# Patient Record
Sex: MALE | Race: WHITE | Employment: FULL TIME | URBAN - METROPOLITAN AREA
[De-identification: names, ages, dates, MRNs, and addresses within clinical notes are randomized per-mention and may not be internally consistent; named-entity substitution may affect disease eponyms.]

---

## 2017-06-08 ENCOUNTER — HOSPITAL ENCOUNTER (OUTPATIENT)
Dept: CT IMAGING | Age: 37
Discharge: HOME | End: 2017-06-08
Attending: EMERGENCY MEDICINE
Payer: COMMERCIAL

## 2017-06-08 VITALS — WEIGHT: 180 LBS | BODY MASS INDEX: 25.77 KG/M2 | HEIGHT: 70 IN

## 2017-06-08 DIAGNOSIS — R10.31 RLQ ABDOMINAL PAIN: ICD-10-CM

## 2017-06-08 PROCEDURE — 74011636320 HC RX REV CODE- 636/320

## 2017-06-08 PROCEDURE — 74177 CT ABD & PELVIS W/CONTRAST: CPT

## 2017-06-08 PROCEDURE — 74011000258 HC RX REV CODE- 258

## 2017-06-08 RX ORDER — SODIUM CHLORIDE 0.9 % (FLUSH) 0.9 %
10 SYRINGE (ML) INJECTION
Status: COMPLETED | OUTPATIENT
Start: 2017-06-08 | End: 2017-06-08

## 2017-06-08 RX ADMIN — Medication 10 ML: at 20:37

## 2017-06-08 RX ADMIN — SODIUM CHLORIDE 100 ML: 900 INJECTION, SOLUTION INTRAVENOUS at 20:37

## 2017-06-08 RX ADMIN — IOPAMIDOL 100 ML: 755 INJECTION, SOLUTION INTRAVENOUS at 20:37

## 2017-06-08 RX ADMIN — DIATRIZOATE MEGLUMINE AND DIATRIZOATE SODIUM 15 ML: 660; 100 LIQUID ORAL; RECTAL at 20:37

## 2018-11-16 ENCOUNTER — HOSPITAL ENCOUNTER (EMERGENCY)
Age: 38
Discharge: HOME | End: 2018-11-16
Attending: EMERGENCY MEDICINE
Payer: SELF-PAY

## 2018-11-16 VITALS
DIASTOLIC BLOOD PRESSURE: 90 MMHG | BODY MASS INDEX: 25.77 KG/M2 | WEIGHT: 180 LBS | HEART RATE: 90 BPM | TEMPERATURE: 98.1 F | OXYGEN SATURATION: 96 % | SYSTOLIC BLOOD PRESSURE: 135 MMHG | HEIGHT: 70 IN | RESPIRATION RATE: 20 BRPM

## 2018-11-16 DIAGNOSIS — M53.3 SACROILIAC JOINT PAIN: Primary | ICD-10-CM

## 2018-11-16 DIAGNOSIS — M54.31 SCIATICA OF RIGHT SIDE: ICD-10-CM

## 2018-11-16 PROCEDURE — 99283 EMERGENCY DEPT VISIT LOW MDM: CPT | Performed by: EMERGENCY MEDICINE

## 2018-11-16 PROCEDURE — 74011250636 HC RX REV CODE- 250/636: Performed by: EMERGENCY MEDICINE

## 2018-11-16 PROCEDURE — 96372 THER/PROPH/DIAG INJ SC/IM: CPT | Performed by: EMERGENCY MEDICINE

## 2018-11-16 RX ORDER — HYDROCODONE BITARTRATE AND ACETAMINOPHEN 5; 325 MG/1; MG/1
1 TABLET ORAL
Qty: 6 TAB | Refills: 0 | Status: SHIPPED | OUTPATIENT
Start: 2018-11-16

## 2018-11-16 RX ORDER — PREDNISONE 20 MG/1
40 TABLET ORAL DAILY
Qty: 10 TAB | Refills: 0 | Status: SHIPPED | OUTPATIENT
Start: 2018-11-16 | End: 2018-11-21

## 2018-11-16 RX ORDER — DEXAMETHASONE SODIUM PHOSPHATE 100 MG/10ML
10 INJECTION INTRAMUSCULAR; INTRAVENOUS ONCE
Status: COMPLETED | OUTPATIENT
Start: 2018-11-16 | End: 2018-11-16

## 2018-11-16 RX ORDER — MORPHINE SULFATE 10 MG/ML
6 INJECTION, SOLUTION INTRAMUSCULAR; INTRAVENOUS
Status: COMPLETED | OUTPATIENT
Start: 2018-11-16 | End: 2018-11-16

## 2018-11-16 RX ADMIN — MORPHINE SULFATE 6 MG: 10 INJECTION INTRAVENOUS at 14:18

## 2018-11-16 RX ADMIN — DEXAMETHASONE SODIUM PHOSPHATE 10 MG: 10 INJECTION INTRAMUSCULAR; INTRAVENOUS at 14:18

## 2018-11-16 NOTE — ED PROVIDER NOTES
59-year-old white male has been having mild right lower back pain for approximately a week. Today while stepping into the shower he slipped but did not fall. The slip aggravated the pain in his back. Patient indicates the pain is in the area of the SI joint. It does radiate into his right leg. No loss of bowel or bladder function. Some tingling to his left leg but no weakness. The history is provided by the patient. Back Pain Pertinent negatives include no chest pain, no fever, no headaches, no abdominal pain and no dysuria. History reviewed. No pertinent past medical history. Past Surgical History:  
Procedure Laterality Date  HX ORTHOPAEDIC    
 left arm History reviewed. No pertinent family history. Social History Socioeconomic History  Marital status:  Spouse name: Not on file  Number of children: Not on file  Years of education: Not on file  Highest education level: Not on file Social Needs  Financial resource strain: Not on file  Food insecurity - worry: Not on file  Food insecurity - inability: Not on file  Transportation needs - medical: Not on file  Transportation needs - non-medical: Not on file Occupational History  Not on file Tobacco Use  Smoking status: Former Smoker Packs/day: 0.25 Last attempt to quit: 7/13/2015 Years since quitting: 3.3  Smokeless tobacco: Never Used Substance and Sexual Activity  Alcohol use: Yes Alcohol/week: 1.0 oz Types: 2 Cans of beer per week  Drug use: No  
 Sexual activity: Not on file Other Topics Concern  Not on file Social History Narrative  Not on file ALLERGIES: Patient has no known allergies. Review of Systems Constitutional: Negative for fever. Respiratory: Negative for cough and shortness of breath. Cardiovascular: Negative for chest pain. Gastrointestinal: Negative for abdominal pain, nausea and vomiting. Genitourinary: Negative for dysuria. Musculoskeletal: Positive for back pain. Skin: Negative for rash. Neurological: Negative for headaches. Vitals:  
 11/16/18 1229 BP: (!) 136/103 Pulse: 90 Resp: 24 Temp: 97.5 °F (36.4 °C) SpO2: 94% Weight: 81.6 kg (180 lb) Height: 5' 10\" (1.778 m) Physical Exam  
Constitutional: He appears well-developed and well-nourished. He appears distressed. HENT:  
Head: Normocephalic and atraumatic. Cardiovascular: Normal rate. Musculoskeletal:  
Tenderness right SI joint. Pain with right leg raise. Neurological: He is alert. He displays normal reflexes. Coordination normal.  
Skin: Skin is warm and dry. Psychiatric: He has a normal mood and affect. His behavior is normal.  
Nursing note and vitals reviewed. MDM Number of Diagnoses or Management Options Diagnosis management comments: Symptoms consistent with sacroiliac joint and sciatica. She did not have a fall or direct trauma do not feel imaging is necessary. He does have good reflexes and movement lower extremities. For now will treat with prednisone and a short course of hydrocodone. Follow-up with primary care. Amount and/or Complexity of Data Reviewed Tests in the medicine section of CPT®: ordered and reviewed Risk of Complications, Morbidity, and/or Mortality Presenting problems: low Diagnostic procedures: low Management options: low Procedures

## 2018-11-16 NOTE — LETTER
[facility] EMERGENCY DEPT 
[address] [place] [id number] [phone] Work/School Note Date: 11/16/2018 To Whom It May concern: Yasmeen Lopez was seen and treated today in the emergency room by the following provider(s): 
Attending Provider: Jenn Tabor MD.   
 
Yasmeen Lopez may return to work on 11/19/2018.  
 
Sincerely, 
 
 
 
 
Flakita Bryan MD

## 2018-11-16 NOTE — ED TRIAGE NOTES
Pt states sharp pain and pressure in lower right back. Pt states pain has been going on all week, pt states he slipped and lost balance in shower today, did not fall but it caridad the pain worse. Pain radiates down into right leg. Pain states pain feels like a knife is stabbing into back. No hx of kidney stones. No recent injury/trauma.

## 2018-11-16 NOTE — ED NOTES
I have reviewed discharge instructions with the patient. The patient verbalized understanding. Patient left ED via Discharge Method: wheelchair to Home with self. Opportunity for questions and clarification provided. Patient given 2 scripts. To continue your aftercare when you leave the hospital, you may receive an automated call from our care team to check in on how you are doing. This is a free service and part of our promise to provide the best care and service to meet your aftercare needs.  If you have questions, or wish to unsubscribe from this service please call [phone]. Thank you for Choosing our [facility] Emergency Department.

## 2018-11-16 NOTE — DISCHARGE INSTRUCTIONS
Sciatica: Care Instructions  Your Care Instructions    Sciatica (say \"mox-OS-tb-kuh\") is an irritation of one of the sciatic nerves, which come from the spinal cord in the lower back. The sciatic nerves and their branches extend down through the buttock to the foot. Sciatica can develop when an injured disc in the back presses against a spinal nerve root. Its main symptom is pain, numbness, or weakness that is often worse in the leg or foot than in the back. Sciatica often will improve and go away with time. Early treatment usually includes medicines and exercises to relieve pain. Follow-up care is a key part of your treatment and safety. Be sure to make and go to all appointments, and call your doctor if you are having problems. It's also a good idea to know your test results and keep a list of the medicines you take. How can you care for yourself at home? · Take pain medicines exactly as directed. ? If the doctor gave you a prescription medicine for pain, take it as prescribed. ? If you are not taking a prescription pain medicine, ask your doctor if you can take an over-the-counter medicine. · Use heat or ice to relieve pain. ? To apply heat, put a warm water bottle, heating pad set on low, or warm cloth on your back. Do not go to sleep with a heating pad on your skin. ? To use ice, put ice or a cold pack on the area for 10 to 20 minutes at a time. Put a thin cloth between the ice and your skin. · Avoid sitting if possible, unless it feels better than standing. · Alternate lying down with short walks. Increase your walking distance as you are able to without making your symptoms worse. · Do not do anything that makes your symptoms worse. When should you call for help? Call 911 anytime you think you may need emergency care.  For example, call if:    · You are unable to move a leg at all.   [facility] your doctor now or seek immediate medical care if:    · You have new or worse symptoms in your legs or buttocks. Symptoms may include:  ? Numbness or tingling. ? Weakness. ? Pain.     · You lose bladder or bowel control.    Watch closely for changes in your health, and be sure to contact your doctor if:    · You are not getting better as expected. Where can you learn more? Go to http://clifford-zachariah.info/. Enter [phone] in the search box to learn more about \"Sciatica: Care Instructions. \"  Current as of: November 29, 2017  Content Version: 11.8  © 3497-9419 [employer]. Care instructions adapted under license by [employer] (which disclaims liability or warranty for this information). If you have questions about a medical condition or this instruction, always ask your healthcare professional. [address] any warranty or liability for your use of this information. Sacroiliac Joint Pain: Care Instructions  Your Care Instructions    The sacroiliac joints connect the spine and each side of the pelvis. These joints bear the weight and stress of your torso. This makes them easy to injure. Injury or overuse of these joints may cause low back pain. Stress on these joints can cause joint pain. Sacroiliac joint pain is more common in pregnant women. Certain kinds of arthritis also may cause this type of joint pain. Home treatment may help you feel better. So can avoiding activities that stress your back. Your doctor also may recommend physical therapy. This may include doing exercises and stretches to help with pain. You may also learn to use good posture. Follow-up care is a key part of your treatment and safety. Be sure to make and go to all appointments, and call your doctor if you are having problems. It's also a good idea to know your test results and keep a list of the medicines you take. How can you care for yourself at home? · Ask your doctor about light exercises that may help your back pain. Try to do light activity throughout the day. But make sure to take rests as needed. Find a comfortable position for rest, but don't stay in one position for too long. Avoid activities that cause pain. · To apply heat, put a warm water bottle, a heating pad set on low, or a warm cloth on your back. Do not go to sleep with a heating pad on your skin. · Put ice or a cold pack on your back for 10 to 20 minutes at a time. Put a thin cloth between the ice and your skin. · If the doctor gave you a prescription medicine for pain, take it as prescribed. · If you are not taking a prescription pain medicine, ask your doctor if you can take an over-the-counter pain medicine, such as acetaminophen (Tylenol), ibuprofen (Advil, Motrin), or naproxen (Aleve). Read and follow all instructions on the label. Take pain medicines exactly as directed. · Do not take two or more pain medicines at the same time unless the doctor told you to. Many pain medicines have acetaminophen, which is Tylenol. Too much acetaminophen (Tylenol) can be harmful. · To prevent future back pain, do exercises to stretch and strengthen your back and stomach. Learn how to use good posture, safe lifting techniques, and proper body mechanics. When should you call for help? Call 911 anytime you think you may need emergency care. For example, call if:    · You are unable to move a leg at all.   [facility] your doctor now or seek immediate medical care if:    · You have new or worse symptoms in your legs or buttocks. Symptoms may include:  ? Numbness or tingling. ? Weakness. ? Pain.     · You lose bladder or bowel control.    Watch closely for changes in your health, and be sure to contact your doctor if:    · You are not getting better as expected. Where can you learn more? Go to http://clifford-zachariah.info/. Enter G323 in the search box to learn more about \"Sacroiliac Joint Pain: Care Instructions. \"  Current as of: November 29, 2017  Content Version: 11.8  © 7212-9015 [employer]. Care instructions adapted under license by [employer] (which disclaims liability or warranty for this information). If you have questions about a medical condition or this instruction, always ask your healthcare professional. [address] any warranty or liability for your use of this information.

## 2023-06-07 ENCOUNTER — OFFICE VISIT (OUTPATIENT)
Dept: OCCUPATIONAL MEDICINE | Age: 43
End: 2023-06-07

## 2023-06-07 VITALS
TEMPERATURE: 98.2 F | DIASTOLIC BLOOD PRESSURE: 86 MMHG | HEART RATE: 94 BPM | OXYGEN SATURATION: 99 % | RESPIRATION RATE: 16 BRPM | SYSTOLIC BLOOD PRESSURE: 138 MMHG

## 2023-06-07 DIAGNOSIS — J01.90 ACUTE SINUSITIS, RECURRENCE NOT SPECIFIED, UNSPECIFIED LOCATION: ICD-10-CM

## 2023-06-07 DIAGNOSIS — J06.9 UPPER RESPIRATORY TRACT INFECTION, UNSPECIFIED TYPE: Primary | ICD-10-CM

## 2023-06-07 RX ORDER — AMOXICILLIN 500 MG/1
500 CAPSULE ORAL 2 TIMES DAILY
Qty: 14 CAPSULE | Refills: 0 | OUTPATIENT
Start: 2023-06-07 | End: 2023-06-14

## 2023-06-07 RX ORDER — CETIRIZINE HYDROCHLORIDE 10 MG/1
10 TABLET ORAL DAILY
COMMUNITY

## 2023-06-07 ASSESSMENT — ENCOUNTER SYMPTOMS
TROUBLE SWALLOWING: 0
SORE THROAT: 1
COUGH: 1
WHEEZING: 0
CHEST TIGHTNESS: 0
HEARTBURN: 0
STRIDOR: 0
SINUS PRESSURE: 1
SINUS PAIN: 1
RHINORRHEA: 1
SHORTNESS OF BREATH: 0

## 2023-06-07 NOTE — PROGRESS NOTES
PROGRESS NOTE    SUBJECTIVE:   Mae Hayden is a 43 y.o. male seen for ____. Chief Complaint    Cough; Congestion         Cough  This is a new problem. Episode onset: over 1 week. The problem has been gradually worsening. The problem occurs constantly. The cough is Productive of sputum (Thick green secretions). Associated symptoms include headaches, nasal congestion, postnasal drip, rhinorrhea and a sore throat. Pertinent negatives include no chest pain, chills, ear congestion, ear pain, fever, heartburn, myalgias, rash, shortness of breath or wheezing. Associated symptoms comments: Sinus pain and pressure across the forehead and right cheek area. Treatments tried: Zyrtec daily and Mucinex prn. The treatment provided no relief. His past medical history is significant for environmental allergies. There is no history of asthma, bronchitis, COPD, emphysema or pneumonia. Current Outpatient Medications   Medication Sig Dispense Refill    cetirizine (ZYRTEC) 10 MG tablet Take 1 tablet by mouth daily       No current facility-administered medications for this visit. No Known Allergies          Review of Systems   Constitutional:  Negative for chills and fever. HENT:  Positive for congestion, postnasal drip, rhinorrhea, sinus pressure, sinus pain and sore throat. Negative for ear pain and trouble swallowing. Respiratory:  Positive for cough. Negative for chest tightness, shortness of breath, wheezing and stridor. Cardiovascular: Negative. Negative for chest pain. Gastrointestinal:  Negative for heartburn. Musculoskeletal:  Negative for myalgias. Skin:  Negative for rash. Allergic/Immunologic: Positive for environmental allergies. Neurological:  Positive for headaches. Negative for dizziness.         OBJECTIVE:  /86 (Site: Left Upper Arm, Position: Sitting, Cuff Size: Medium Adult)   Pulse 94   Temp 98.2 °F (36.8 °C)   Resp 16   SpO2 99%      No results found for this visit on

## 2023-06-21 ENCOUNTER — OFFICE VISIT (OUTPATIENT)
Dept: OCCUPATIONAL MEDICINE | Age: 43
End: 2023-06-21

## 2023-06-21 VITALS
DIASTOLIC BLOOD PRESSURE: 88 MMHG | RESPIRATION RATE: 16 BRPM | HEART RATE: 90 BPM | OXYGEN SATURATION: 98 % | SYSTOLIC BLOOD PRESSURE: 112 MMHG | TEMPERATURE: 98.2 F

## 2023-06-21 DIAGNOSIS — R05.1 ACUTE COUGH: Primary | ICD-10-CM

## 2023-06-21 DIAGNOSIS — J01.90 ACUTE SINUSITIS, RECURRENCE NOT SPECIFIED, UNSPECIFIED LOCATION: ICD-10-CM

## 2023-06-21 DIAGNOSIS — Z09 FOLLOW-UP EXAM: ICD-10-CM

## 2023-06-21 RX ORDER — ACETAMINOPHEN 500 MG
500 TABLET ORAL EVERY 6 HOURS PRN
COMMUNITY

## 2023-06-21 ASSESSMENT — ENCOUNTER SYMPTOMS
CHEST TIGHTNESS: 0
SINUS PRESSURE: 0
SHORTNESS OF BREATH: 0
WHEEZING: 0
STRIDOR: 0
RHINORRHEA: 0
TROUBLE SWALLOWING: 0
COUGH: 1
SINUS PAIN: 0
SORE THROAT: 0

## 2023-06-21 NOTE — PROGRESS NOTES
PROGRESS NOTE    SUBJECTIVE:   Desirae Awad is a 37 y.o. male seen for ____. Chief Complaint    Cough         Cough  This is a new problem. Episode onset: Patient was seen on 23 for sinusitis and cough starting > 7 days from initial visit. He was treated with antibiotics x 7 days. He is f/u today reporting symptoms have improved but still coughing. The problem has been gradually improving. The cough is Non-productive. Associated symptoms include postnasal drip. Pertinent negatives include no chills, ear congestion, ear pain, fever, headaches, myalgias, nasal congestion, rash, rhinorrhea, sore throat, shortness of breath or wheezing. Treatments tried: Amoxicillin 500 mg one po BID x 7 days, Zyrtec daily, Delsum prn. The treatment provided moderate (Denies - HA, sinus congestion/pressure or pain. He reports the ST has improved and no longer coughing up green sputum, just more of a dry cough) relief. Current Outpatient Medications   Medication Sig Dispense Refill    acetaminophen (TYLENOL) 500 MG tablet Take 1 tablet by mouth every 6 hours as needed for Pain      cetirizine (ZYRTEC) 10 MG tablet Take 1 tablet by mouth daily       No current facility-administered medications for this visit. No Known Allergies    Social History     Tobacco Use    Smoking status: Former     Types: Cigarettes     Quit date:      Years since quittin.4    Smokeless tobacco: Never   Substance Use Topics    Alcohol use: Not Currently        Review of Systems   Constitutional:  Negative for chills, fatigue and fever. HENT:  Positive for postnasal drip. Negative for congestion, ear pain, rhinorrhea, sinus pressure, sinus pain, sneezing, sore throat and trouble swallowing. Throat irritated from cough and drainage   Respiratory:  Positive for cough. Negative for chest tightness, shortness of breath, wheezing and stridor. Cardiovascular: Negative. Musculoskeletal:  Negative for myalgias.    Skin:

## 2023-12-03 ENCOUNTER — HOSPITAL ENCOUNTER (EMERGENCY)
Age: 43
Discharge: HOME | End: 2023-12-03

## 2023-12-03 ENCOUNTER — APPOINTMENT (OUTPATIENT)
Dept: GENERAL RADIOLOGY | Age: 43
End: 2023-12-03

## 2023-12-03 VITALS
HEART RATE: 77 BPM | TEMPERATURE: 98.1 F | RESPIRATION RATE: 16 BRPM | HEIGHT: 70 IN | WEIGHT: 200 LBS | BODY MASS INDEX: 28.63 KG/M2 | SYSTOLIC BLOOD PRESSURE: 138 MMHG | DIASTOLIC BLOOD PRESSURE: 93 MMHG | OXYGEN SATURATION: 96 %

## 2023-12-03 DIAGNOSIS — Z53.21 ELOPED FROM EMERGENCY DEPARTMENT: ICD-10-CM

## 2023-12-03 DIAGNOSIS — R07.89 OTHER CHEST PAIN: Primary | ICD-10-CM

## 2023-12-03 PROCEDURE — 93005 ELECTROCARDIOGRAM TRACING: CPT | Performed by: EMERGENCY MEDICINE

## 2023-12-03 PROCEDURE — 99283 EMERGENCY DEPT VISIT LOW MDM: CPT

## 2023-12-03 RX ORDER — MAGNESIUM HYDROXIDE/ALUMINUM HYDROXICE/SIMETHICONE 120; 1200; 1200 MG/30ML; MG/30ML; MG/30ML
30 SUSPENSION ORAL
Status: DISCONTINUED | OUTPATIENT
Start: 2023-12-03 | End: 2023-12-03

## 2023-12-03 RX ORDER — LIDOCAINE HYDROCHLORIDE 20 MG/ML
15 SOLUTION OROPHARYNGEAL
Status: DISCONTINUED | OUTPATIENT
Start: 2023-12-03 | End: 2023-12-03

## 2023-12-03 RX ORDER — ONDANSETRON 2 MG/ML
4 INJECTION INTRAMUSCULAR; INTRAVENOUS
Status: DISCONTINUED | OUTPATIENT
Start: 2023-12-03 | End: 2023-12-03

## 2023-12-03 ASSESSMENT — PAIN DESCRIPTION - LOCATION: LOCATION: CHEST

## 2023-12-03 ASSESSMENT — LIFESTYLE VARIABLES
HOW OFTEN DO YOU HAVE A DRINK CONTAINING ALCOHOL: MONTHLY OR LESS
HOW MANY STANDARD DRINKS CONTAINING ALCOHOL DO YOU HAVE ON A TYPICAL DAY: 1 OR 2

## 2023-12-03 ASSESSMENT — PAIN - FUNCTIONAL ASSESSMENT: PAIN_FUNCTIONAL_ASSESSMENT: 0-10

## 2023-12-03 ASSESSMENT — PAIN DESCRIPTION - ORIENTATION: ORIENTATION: MID

## 2023-12-03 ASSESSMENT — PAIN SCALES - GENERAL: PAINLEVEL_OUTOF10: 7

## 2023-12-03 NOTE — ED PROVIDER NOTES
Emergency Department Provider Note       PCP: BLAZE Richard NP   Age: 37 y.o. Sex: male     DISPOSITION Eloped - Left Before Treatment Complete 12/03/2023 05:03:59 PM       ICD-10-CM    1. Other chest pain  R07.89       2. Eloped from emergency department  Z53.21           Medical Decision Making     Complexity of Problems Addressed:  1 acute problem    Data Reviewed and Analyzed:   I independently ordered and reviewed each unique test.         I independently ordered and interpreted the ED EKG in the absence of a Cardiologist.    Rate: 75  EKG Interpretation: EKG Interpretation: sinus rhythm, no evidence of arrhythmia  ST Segments: Normal ST segments - NO STEMI      I independently interpreted the cardiac monitor rhythm strip 75 bpm normal sinus rhythm. Discussion of management or test interpretation. Patient presents emergency department with concerns of chest pain that began earlier as afternoon after he had a late lunch. EKG was performed and indicated to be normal sinus rhythm 75 bpm.  Was to have labs and chest x-ray imaging performed. Unbeknownst to me patient went up to staff in the lobby and stated that he did not want to wait to have labs and chest x-ray imaging performed and he eloped from this department. Risk of Complications and/or Morbidity of Patient Management:      ED Course as of 12/03/23 1750   Sun Dec 03, 2023   1706 Per registration in first look RN patient came over to desk and said that he did not want to wait to have chest x-ray and blood test done so he eloped from the emergency department without my knowledge. [ET]      ED Course User Index  [ET] TYREE Vincent       Is this patient to be included in the SEP-1 core measure due to severe sepsis or septic shock? No Exclusion criteria - the patient is NOT to be included for SEP-1 Core Measure due to:  Infection is not suspected      History      51-year-old male with otherwise unremarkable past medical

## 2023-12-03 NOTE — ED TRIAGE NOTES
Pt presents ambulatory from home with c/o CP radiating to jaw about one hour ago.   Pt endorses some lightheadedness on the way to the hospital.

## 2023-12-05 LAB
EKG ATRIAL RATE: 75 BPM
EKG DIAGNOSIS: NORMAL
EKG P AXIS: 55 DEGREES
EKG P-R INTERVAL: 174 MS
EKG Q-T INTERVAL: 382 MS
EKG QRS DURATION: 92 MS
EKG QTC CALCULATION (BAZETT): 426 MS
EKG R AXIS: 57 DEGREES
EKG T AXIS: 31 DEGREES
EKG VENTRICULAR RATE: 75 BPM

## 2023-12-05 PROCEDURE — 93010 ELECTROCARDIOGRAM REPORT: CPT | Performed by: INTERNAL MEDICINE

## 2024-03-15 ENCOUNTER — OFFICE VISIT (OUTPATIENT)
Age: 44
End: 2024-03-15

## 2024-03-15 VITALS
TEMPERATURE: 97.8 F | HEART RATE: 94 BPM | OXYGEN SATURATION: 98 % | SYSTOLIC BLOOD PRESSURE: 120 MMHG | DIASTOLIC BLOOD PRESSURE: 88 MMHG | RESPIRATION RATE: 16 BRPM

## 2024-03-15 DIAGNOSIS — Z13.220 SCREENING FOR LIPID DISORDERS: ICD-10-CM

## 2024-03-15 DIAGNOSIS — Z02.89 EXAMINATION, PHYSICAL, EMPLOYEE: Primary | ICD-10-CM

## 2024-03-15 DIAGNOSIS — Z13.1 SCREENING FOR DIABETES MELLITUS: ICD-10-CM

## 2024-03-15 DIAGNOSIS — Z00.00 LABORATORY EXAM ORDERED AS PART OF ROUTINE GENERAL MEDICAL EXAMINATION: ICD-10-CM

## 2024-03-15 DIAGNOSIS — Z13.29 SCREENING FOR THYROID DISORDER: ICD-10-CM

## 2024-03-15 NOTE — PROGRESS NOTES
draw.  f/u in the [facility] in 1-2 weeks to review results and get a copy for PCP. Denies questions or concerns    __x_Physically meets Annual Health Physical Requirements.      ___Does not meet Annual Health Physical Requirements. Referred to Primary Care Physician         I have reviewed the patient's medication list, past medical, social, and surgical history in detail and updated the patient record appropriately.    Gwen Napoles, APRN - CNP

## 2024-03-20 ENCOUNTER — OFFICE VISIT (OUTPATIENT)
Age: 44
End: 2024-03-20

## 2024-03-20 ENCOUNTER — TELEPHONE (OUTPATIENT)
Age: 44
End: 2024-03-20

## 2024-03-20 DIAGNOSIS — Z71.2 ENCOUNTER TO DISCUSS TEST RESULTS: ICD-10-CM

## 2024-03-20 DIAGNOSIS — E78.2 MIXED HYPERLIPIDEMIA: Primary | ICD-10-CM

## 2024-03-20 LAB
ALBUMIN SERPL-MCNC: 4.7 G/DL (ref 4.1–5.1)
ALBUMIN/GLOB SERPL: 1.9 {RATIO} (ref 1.2–2.2)
ALP SERPL-CCNC: 76 IU/L (ref 44–121)
ALT SERPL-CCNC: 54 IU/L (ref 0–44)
AST SERPL-CCNC: 46 IU/L (ref 0–40)
BASOPHILS # BLD AUTO: 0 X10E3/UL (ref 0–0.2)
BASOPHILS NFR BLD AUTO: 1 %
BILIRUB SERPL-MCNC: 0.5 MG/DL (ref 0–1.2)
BUN SERPL-MCNC: 11 MG/DL (ref 6–24)
BUN/CREAT SERPL: 12 (ref 9–20)
CALCIUM SERPL-MCNC: 9.5 MG/DL (ref 8.7–10.2)
CHLORIDE SERPL-SCNC: 101 MMOL/L (ref 96–106)
CHOLEST SERPL-MCNC: 255 MG/DL (ref 100–199)
CO2 SERPL-SCNC: 21 MMOL/L (ref 20–29)
CREAT SERPL-MCNC: 0.92 MG/DL (ref 0.76–1.27)
EGFRCR SERPLBLD CKD-EPI 2021: 106 ML/MIN/1.73
EOSINOPHIL # BLD AUTO: 0.1 X10E3/UL (ref 0–0.4)
EOSINOPHIL NFR BLD AUTO: 2 %
ERYTHROCYTE [DISTWIDTH] IN BLOOD BY AUTOMATED COUNT: 13 % (ref 11.6–15.4)
GLOBULIN SER CALC-MCNC: 2.5 G/DL (ref 1.5–4.5)
GLUCOSE SERPL-MCNC: 98 MG/DL (ref 70–99)
HBA1C MFR BLD: 5.3 % (ref 4.8–5.6)
HCT VFR BLD AUTO: 45.2 % (ref 37.5–51)
HDLC SERPL-MCNC: 36 MG/DL
HGB BLD-MCNC: 15.4 G/DL (ref 13–17.7)
IMM GRANULOCYTES # BLD AUTO: 0 X10E3/UL (ref 0–0.1)
IMM GRANULOCYTES NFR BLD AUTO: 1 %
LDLC SERPL CALC-MCNC: 97 MG/DL (ref 0–99)
LYMPHOCYTES # BLD AUTO: 1.9 X10E3/UL (ref 0.7–3.1)
LYMPHOCYTES NFR BLD AUTO: 35 %
MCH RBC QN AUTO: 29.7 PG (ref 26.6–33)
MCHC RBC AUTO-ENTMCNC: 34.1 G/DL (ref 31.5–35.7)
MCV RBC AUTO: 87 FL (ref 79–97)
MONOCYTES # BLD AUTO: 0.4 X10E3/UL (ref 0.1–0.9)
MONOCYTES NFR BLD AUTO: 7 %
NEUTROPHILS # BLD AUTO: 3 X10E3/UL (ref 1.4–7)
NEUTROPHILS NFR BLD AUTO: 54 %
PLATELET # BLD AUTO: 189 X10E3/UL (ref 150–450)
POTASSIUM SERPL-SCNC: 4.1 MMOL/L (ref 3.5–5.2)
PROT SERPL-MCNC: 7.2 G/DL (ref 6–8.5)
RBC # BLD AUTO: 5.19 X10E6/UL (ref 4.14–5.8)
SODIUM SERPL-SCNC: 139 MMOL/L (ref 134–144)
TRIGL SERPL-MCNC: 719 MG/DL (ref 0–149)
TSH SERPL DL<=0.005 MIU/L-ACNC: 3 UIU/ML (ref 0.45–4.5)
VLDLC SERPL CALC-MCNC: 122 MG/DL (ref 5–40)
WBC # BLD AUTO: 5.4 X10E3/UL (ref 3.4–10.8)

## 2024-03-20 NOTE — PROGRESS NOTES
PROGRESS NOTE    SUBJECTIVE:   Joe Holman is a 43 y.o. male seen for ____.        HPI  Patient is following up today to review lab results drawn 3/19/24  Denies C/O    Current Outpatient Medications   Medication Sig Dispense Refill    acetaminophen (TYLENOL) 500 MG tablet Take 1 tablet by mouth every 6 hours as needed for Pain      cetirizine (ZYRTEC) 10 MG tablet Take 1 tablet by mouth daily       No current facility-administered medications for this visit.      No Known Allergies    Social History     Tobacco Use    Smoking status: Former     Current packs/day: 0.00     Types: Cigarettes     Quit date:      Years since quittin.2    Smokeless tobacco: Never   Substance Use Topics    Alcohol use: Not Currently        Review of Systems   All other systems reviewed and are negative.         OBJECTIVE:     Labs from 3/19/24:  CBC, TSH, HgA1c normal  CMP- mild elevation of AST- 46 and ALT- 54, otherwise normal CMP     Contains critical data Lipid Panel        Component  Ref Range & Units 3/19/24 1624   Cholesterol  100 - 199 mg/dL 255 High    Triglycerides  0 - 149 mg/dL 719 High Panic    HDL  >39 mg/dL 36 Low    VLDL Cholesterol Calculated  5 - 40 mg/dL 122 High    LDL Calculated  0 - 99 mg/dL 97   Resulting Agency [facility]        Lipid panel from 23:  TC-152  Triglycerides- 87  HDL- 39 Low  VLDL- 17  LDL- 96    Physical Exam  Constitutional:       General: He is not in acute distress.     Appearance: Normal appearance. He is not ill-appearing.   Pulmonary:      Effort: Pulmonary effort is normal. No respiratory distress.   Skin:     General: Skin is warm and dry.   Neurological:      General: No focal deficit present.      Mental Status: He is alert and oriented to person, place, and time.      Motor: No weakness.      Coordination: Coordination normal.      Gait: Gait normal.   Psychiatric:         Mood and Affect: Mood normal.         Behavior: Behavior normal.         Thought Content: Thought

## 2024-03-20 NOTE — TELEPHONE ENCOUNTER
Called to f/u with patient and review lab results. Patient reports he is at work today and will stop in at 1130 to review lab result.   Patient denies c/o  Appt for today at 1130am made  Patient denies questions or concenrs

## 2024-03-25 LAB
CHOLEST SERPL-MCNC: 239 MG/DL (ref 100–199)
CHOLEST/HDLC SERPL: 5.4 RATIO (ref 0–5)
HDLC SERPL-MCNC: 44 MG/DL
LDLC SERPL CALC-MCNC: 141 MG/DL (ref 0–99)
TRIGL SERPL-MCNC: 299 MG/DL (ref 0–149)
VLDLC SERPL CALC-MCNC: 54 MG/DL (ref 5–40)

## 2024-10-11 ENCOUNTER — TELEPHONE (OUTPATIENT)
Age: 44
End: 2024-10-11

## 2024-12-04 ENCOUNTER — OFFICE VISIT (OUTPATIENT)
Age: 44
End: 2024-12-04

## 2024-12-04 VITALS
TEMPERATURE: 98.3 F | HEART RATE: 78 BPM | DIASTOLIC BLOOD PRESSURE: 98 MMHG | OXYGEN SATURATION: 97 % | SYSTOLIC BLOOD PRESSURE: 134 MMHG | RESPIRATION RATE: 18 BRPM

## 2024-12-04 DIAGNOSIS — J01.00 ACUTE MAXILLARY SINUSITIS, RECURRENCE NOT SPECIFIED: Primary | ICD-10-CM

## 2024-12-04 RX ORDER — AMOXICILLIN 500 MG/1
500 CAPSULE ORAL 2 TIMES DAILY
Qty: 14 CAPSULE | Refills: 0 | Status: SHIPPED | OUTPATIENT
Start: 2024-12-04 | End: 2024-12-11

## 2024-12-04 RX ORDER — IBUPROFEN 200 MG
200 TABLET ORAL EVERY 6 HOURS PRN
COMMUNITY

## 2024-12-04 ASSESSMENT — ENCOUNTER SYMPTOMS
SINUS PRESSURE: 1
RHINORRHEA: 1
SHORTNESS OF BREATH: 0
SINUS PAIN: 1
STRIDOR: 0
SWOLLEN GLANDS: 0
HOARSE VOICE: 0
SORE THROAT: 0
TROUBLE SWALLOWING: 0
COUGH: 0
CHEST TIGHTNESS: 0

## 2024-12-04 NOTE — PROGRESS NOTES
sinusitis.    Diagnoses and all orders for this visit:    Acute maxillary sinusitis, recurrence not specified  -     amoxicillin (AMOXIL) 500 MG capsule; Take 1 capsule by mouth 2 times daily for 7 days    Stop sudafed- we discussed Sudafed can increase the blood pressure. Recommend rechecking blood pressure, goal <140/90. Follow up if blood pressure continue elevated after stopping Sudafed.    Continue Zyrtec daily and Mucinex as needed  May use cool-mist humidifier/Vaporizer at bedside/living quarters. Recommended saline rinses with OceanMist spray or using NettiPot. Encouraged increase fluid intake for adequate hydration.  Reviewed good hand hygiene.    Tylenol/Ibuprofen for aches/pains.    Throat lozenges, honey, or warm salt water gargles for sore throat.    Informed symptoms should last for 5-7 days but may last up to 2 weeks. Cough may persist after cold symptoms resolve.    Hold treatment with antibiotics. Start antibiotics in 3 days if worsening symptoms or in 7 days if not improving. If start antibiotics finish     Risks and benefits of medications reviewed with patient .    Follow up: 7 days if not any better.  Follow up with Primary Care Provider or Urgent Care if worse or development of high fever.  Seek ER care for chest pain or shortness of breath.    Patient voices understanding and agrees with the plan of care as described above.     Counseled on benefits of having a primary care provider which includes, but is not limited to, continuity of care and having a medical home when concerns arise. Also enforced that onsite clinic policy states that we are not to take the place of a primary care provider, pt verbalized understanding.     SEs and risk vs benefits associated with medications prescribed discussed with patient who verbalized understanding. Patient verbalized understanding and agreement with plan of care. RETURN TO CLINIC for persisting/worsening symptoms or new complaints that arise. Discussed

## 2024-12-11 ENCOUNTER — FOLLOWUP TELEPHONE ENCOUNTER (OUTPATIENT)
Age: 44
End: 2024-12-11

## 2024-12-11 NOTE — TELEPHONE ENCOUNTER
Called patient to follow up on his appointment on 12/4/24 for sinusitis. Patient reports he is feeling much better. Denies questions or concerns

## 2024-12-18 ENCOUNTER — OFFICE VISIT (OUTPATIENT)
Age: 44
End: 2024-12-18

## 2024-12-18 VITALS
OXYGEN SATURATION: 98 % | TEMPERATURE: 98.1 F | RESPIRATION RATE: 16 BRPM | HEART RATE: 82 BPM | DIASTOLIC BLOOD PRESSURE: 96 MMHG | SYSTOLIC BLOOD PRESSURE: 142 MMHG

## 2024-12-18 DIAGNOSIS — J01.00 ACUTE MAXILLARY SINUSITIS, RECURRENCE NOT SPECIFIED: Primary | ICD-10-CM

## 2024-12-18 DIAGNOSIS — Z09 FOLLOW-UP EXAM: ICD-10-CM

## 2024-12-18 RX ORDER — AZITHROMYCIN 250 MG/1
TABLET, FILM COATED ORAL
Qty: 6 TABLET | Refills: 0 | Status: SHIPPED | OUTPATIENT
Start: 2024-12-18 | End: 2024-12-28

## 2024-12-18 ASSESSMENT — ENCOUNTER SYMPTOMS
SINUS COMPLAINT: 1
VOMITING: 0
SHORTNESS OF BREATH: 0
RHINORRHEA: 0
SORE THROAT: 1
NAUSEA: 0
SINUS PRESSURE: 1
HOARSE VOICE: 0
COUGH: 0

## 2024-12-18 NOTE — PROGRESS NOTES
[facility]  ONSITE CLINIC  PROGRESS NOTE    SUBJECTIVE:   Joe Holman is a 44 y.o. male seen here in the onsite clinic at his place of employment, [employer]. he has a Primary Care Provider that he sees regularly, Melita Ortiz APRN - DIETER.      Chief Complaint    Pharyngitis; Sinus Problem     Patient was seen on 12/4/24 for symptoms of sinus problems for 7 days. He was diagnosed with sinusitis and prescribed 7 days of Amoxicillin 500 mg twice a day. He reports completing the antibiotics as prescribed and felt better for less then a week. He states the same symptoms started again several days ago. He is not sure if this is the same sinus infection or exposure to coworkers that are also sick,       History provided by:  Patient  Pharyngitis  Severity:  Mild  Onset quality:  Gradual  Timing:  Intermittent  Chronicity:  New  Associated symptoms: congestion, headaches and sore throat    Associated symptoms: no chest pain, no cough, no ear pain, no fatigue, no fever, no loss of consciousness, no myalgias, no nausea, no rash, no rhinorrhea, no shortness of breath and no vomiting    Sinus Problem  This is a recurrent problem. Episode onset: Intermtiient for Approximately 3 weeks. The problem has been waxing and waning since onset. There has been no fever. Associated symptoms include congestion, headaches, sinus pressure and a sore throat. Pertinent negatives include no chills, coughing, ear pain, hoarse voice, neck pain or shortness of breath. Treatments tried: Zyrtec daily, Ibuprofen prn and Mucinex prn. The treatment provided mild relief.   Patient was seen on 12/4/24 for symptoms of sinus problems for 7 days. Diagnosed with sinusitis and prescribed 7 days of antibiotics. He reports completing the antibiotics and felt better, but symptoms started again several days ago. He is not sure same sinus infection or exposure to coworkers that are also sick     Current Outpatient